# Patient Record
(demographics unavailable — no encounter records)

---

## 2025-02-20 NOTE — DISCUSSION/SUMMARY
[FreeTextEntry1] : Dizziness check echo if able to approve and schedule. HLD he can benefit from a statin, but wants to hold off for the moment.  Carotid us reviewed. Inclined towards a conservative follow up in this patient. We had a careful discussion regarding diet and exercise. Will be happy to re-evaluate. EKG section of the chart --- secondary to symptoms above an electrocardiogram also known as an EKG was performed.  Risks and benefits discussed with the patient. Patient was given time and privacy to changed into a gown. Shortly after, standard 10 leads were applied and a [employer] system was used to perform the study. The results were subsequently reviewed by attending physician and discussed with the patient. The study showed a normal sinus rhythm and no ST-T suggestive of ischemia. Order for the EKG was placed in the chart. The results were documented. Billing submitted. [EKG obtained to assist in diagnosis and management of assessed problem(s)] : EKG obtained to assist in diagnosis and management of assessed problem(s)

## 2025-02-20 NOTE — REASON FOR VISIT
[Symptom and Test Evaluation] : symptom and test evaluation [FreeTextEntry1] : 49M comes in for a re-evaluation. He was recently in [place] and seems to be a bit jet lagged. No chest pain noted. Labs repeated. We are discussing testing and results.

## 2025-06-02 NOTE — DISCUSSION/SUMMARY
[FreeTextEntry1] : Syncope testing completed. Inclined towards a conservative follow up in this patient. We had a careful discussion regarding diet and exercise. Will be happy to re-evaluate. JOHN PABLO and I discussed his lipid panel and individualized target LDL goal. At this point, will do diet and exercise with anticipation of re-evaluating labs in 3-6 months

## 2025-06-02 NOTE — REASON FOR VISIT
[Symptom and Test Evaluation] : symptom and test evaluation [FreeTextEntry1] : 49M comes in for a re-evaluation. He comes back from [facility] and seems to be a bit jet lagged. No chest pain noted. Echo completed. Labs repeated. We are discussing testing and results.